# Patient Record
Sex: MALE | Race: WHITE | NOT HISPANIC OR LATINO | URBAN - METROPOLITAN AREA
[De-identification: names, ages, dates, MRNs, and addresses within clinical notes are randomized per-mention and may not be internally consistent; named-entity substitution may affect disease eponyms.]

---

## 2018-12-08 ENCOUNTER — INPATIENT (INPATIENT)
Facility: HOSPITAL | Age: 83
LOS: 2 days | Discharge: HOME HEALTH | End: 2018-12-11
Attending: INTERNAL MEDICINE | Admitting: INTERNAL MEDICINE
Payer: MEDICARE

## 2018-12-08 VITALS — TEMPERATURE: 98 F | RESPIRATION RATE: 19 BRPM | OXYGEN SATURATION: 100 % | HEART RATE: 92 BPM

## 2018-12-08 LAB
ALBUMIN SERPL ELPH-MCNC: 3.9 G/DL — SIGNIFICANT CHANGE UP (ref 3.3–5)
ALP SERPL-CCNC: 132 U/L — HIGH (ref 40–120)
ALT FLD-CCNC: 46 U/L — SIGNIFICANT CHANGE UP (ref 12–78)
ANION GAP SERPL CALC-SCNC: 10 MMOL/L — SIGNIFICANT CHANGE UP (ref 5–17)
APPEARANCE UR: CLEAR — SIGNIFICANT CHANGE UP
APTT BLD: 29.1 SEC — SIGNIFICANT CHANGE UP (ref 27.5–36.3)
AST SERPL-CCNC: 27 U/L — SIGNIFICANT CHANGE UP (ref 15–37)
BASOPHILS # BLD AUTO: 0.05 K/UL — SIGNIFICANT CHANGE UP (ref 0–0.2)
BASOPHILS NFR BLD AUTO: 0.7 % — SIGNIFICANT CHANGE UP (ref 0–2)
BILIRUB SERPL-MCNC: 0.3 MG/DL — SIGNIFICANT CHANGE UP (ref 0.2–1.2)
BILIRUB UR-MCNC: NEGATIVE — SIGNIFICANT CHANGE UP
BUN SERPL-MCNC: 13 MG/DL — SIGNIFICANT CHANGE UP (ref 7–23)
CALCIUM SERPL-MCNC: 8.9 MG/DL — SIGNIFICANT CHANGE UP (ref 8.5–10.1)
CHLORIDE SERPL-SCNC: 109 MMOL/L — HIGH (ref 96–108)
CO2 SERPL-SCNC: 24 MMOL/L — SIGNIFICANT CHANGE UP (ref 22–31)
COLOR SPEC: YELLOW — SIGNIFICANT CHANGE UP
CREAT SERPL-MCNC: 0.84 MG/DL — SIGNIFICANT CHANGE UP (ref 0.5–1.3)
DIFF PNL FLD: NEGATIVE — SIGNIFICANT CHANGE UP
EOSINOPHIL # BLD AUTO: 0.22 K/UL — SIGNIFICANT CHANGE UP (ref 0–0.5)
EOSINOPHIL NFR BLD AUTO: 3.3 % — SIGNIFICANT CHANGE UP (ref 0–6)
GLUCOSE SERPL-MCNC: 103 MG/DL — HIGH (ref 70–99)
GLUCOSE UR QL: NEGATIVE MG/DL — SIGNIFICANT CHANGE UP
HCT VFR BLD CALC: 43.8 % — SIGNIFICANT CHANGE UP (ref 39–50)
HGB BLD-MCNC: 15.2 G/DL — SIGNIFICANT CHANGE UP (ref 13–17)
IMM GRANULOCYTES NFR BLD AUTO: 0.4 % — SIGNIFICANT CHANGE UP (ref 0–1.5)
INR BLD: 1.06 RATIO — SIGNIFICANT CHANGE UP (ref 0.88–1.16)
KETONES UR-MCNC: ABNORMAL
LEUKOCYTE ESTERASE UR-ACNC: NEGATIVE — SIGNIFICANT CHANGE UP
LYMPHOCYTES # BLD AUTO: 2.43 K/UL — SIGNIFICANT CHANGE UP (ref 1–3.3)
LYMPHOCYTES # BLD AUTO: 36.1 % — SIGNIFICANT CHANGE UP (ref 13–44)
MCHC RBC-ENTMCNC: 33.8 PG — SIGNIFICANT CHANGE UP (ref 27–34)
MCHC RBC-ENTMCNC: 34.7 GM/DL — SIGNIFICANT CHANGE UP (ref 32–36)
MCV RBC AUTO: 97.3 FL — SIGNIFICANT CHANGE UP (ref 80–100)
MONOCYTES # BLD AUTO: 0.67 K/UL — SIGNIFICANT CHANGE UP (ref 0–0.9)
MONOCYTES NFR BLD AUTO: 10 % — SIGNIFICANT CHANGE UP (ref 2–14)
NEUTROPHILS # BLD AUTO: 3.33 K/UL — SIGNIFICANT CHANGE UP (ref 1.8–7.4)
NEUTROPHILS NFR BLD AUTO: 49.5 % — SIGNIFICANT CHANGE UP (ref 43–77)
NITRITE UR-MCNC: NEGATIVE — SIGNIFICANT CHANGE UP
PH UR: 5 — SIGNIFICANT CHANGE UP (ref 5–8)
PLATELET # BLD AUTO: 169 K/UL — SIGNIFICANT CHANGE UP (ref 150–400)
POTASSIUM SERPL-MCNC: 3.7 MMOL/L — SIGNIFICANT CHANGE UP (ref 3.5–5.3)
POTASSIUM SERPL-SCNC: 3.7 MMOL/L — SIGNIFICANT CHANGE UP (ref 3.5–5.3)
PROT SERPL-MCNC: 7.3 GM/DL — SIGNIFICANT CHANGE UP (ref 6–8.3)
PROT UR-MCNC: 30 MG/DL
PROTHROM AB SERPL-ACNC: 11.8 SEC — SIGNIFICANT CHANGE UP (ref 10–12.9)
RBC # BLD: 4.5 M/UL — SIGNIFICANT CHANGE UP (ref 4.2–5.8)
RBC # FLD: 12.6 % — SIGNIFICANT CHANGE UP (ref 10.3–14.5)
SODIUM SERPL-SCNC: 143 MMOL/L — SIGNIFICANT CHANGE UP (ref 135–145)
SP GR SPEC: 1.02 — SIGNIFICANT CHANGE UP (ref 1.01–1.02)
TROPONIN I SERPL-MCNC: 0.01 NG/ML — SIGNIFICANT CHANGE UP (ref 0.01–0.04)
UROBILINOGEN FLD QL: NEGATIVE MG/DL — SIGNIFICANT CHANGE UP
WBC # BLD: 6.73 K/UL — SIGNIFICANT CHANGE UP (ref 3.8–10.5)
WBC # FLD AUTO: 6.73 K/UL — SIGNIFICANT CHANGE UP (ref 3.8–10.5)

## 2018-12-08 PROCEDURE — 93010 ELECTROCARDIOGRAM REPORT: CPT

## 2018-12-08 PROCEDURE — 70450 CT HEAD/BRAIN W/O DYE: CPT | Mod: 26

## 2018-12-08 PROCEDURE — 71045 X-RAY EXAM CHEST 1 VIEW: CPT | Mod: 26

## 2018-12-08 PROCEDURE — 99291 CRITICAL CARE FIRST HOUR: CPT

## 2018-12-08 RX ORDER — ASPIRIN/CALCIUM CARB/MAGNESIUM 324 MG
325 TABLET ORAL DAILY
Qty: 0 | Refills: 0 | Status: DISCONTINUED | OUTPATIENT
Start: 2018-12-08 | End: 2018-12-10

## 2018-12-08 RX ADMIN — Medication 325 MILLIGRAM(S): at 23:38

## 2018-12-08 NOTE — ED PROVIDER NOTE - OBJECTIVE STATEMENT
82 y/o male with a PMHx of HTN, TIA presents to the ED for an episode where the pt was heard moaning, his eyes rolled back, pt had confusion that occurred 25-30 minutes PTA. Takes 81 mg ASA. Hx limited due to pt's current confusion, all hx taken by EMS.

## 2018-12-08 NOTE — ED PROVIDER NOTE - CRITICAL CARE PROVIDED
interpretation of diagnostic studies/additional history taking/conducted a detailed discussion of DNR status/direct patient care (not related to procedure)

## 2018-12-08 NOTE — CONSULT NOTE ADULT - ATTENDING COMMENTS
Agree with above. TPA not given due to rapid improvement of symptoms with minimal deficits upon arrival and the possibility of a seizure.

## 2018-12-08 NOTE — ED PROVIDER NOTE - NEUROLOGICAL, MLM
awake, slow to respond moving all 4 extremities, 5/5 strength on all 4 extremities,  CN ii-xii intact +dysmetric with right and left arm

## 2018-12-08 NOTE — ED ADULT TRIAGE NOTE - CHIEF COMPLAINT QUOTE
Pt presents to the ED c/o AMS. Pt was last seen well approximately 30 mins ago. Pt usually verbal with confusion, now with worsening confusion.  pta. Pt lives with family. Code stroke called at [id number].

## 2018-12-08 NOTE — CONSULT NOTE ADULT - SUBJECTIVE AND OBJECTIVE BOX
nEUROSCIENCE:    84 y/o male with a PMHx of HTN, TIA presents to the ED for an episode where the pt was heard moaning, his eyes rolled back, pt had confusion that occurred 25-30 minutes PTA. Takes 81 mg ASA. Hx limited due to pt's current confusion, all hx taken by EMS.  Last known well 9:15 Daughter at home with father, time of evaluation 10:15.  Pt presents with Left facial droop lower face, otherwise intact - NIHSS of 1.  Pt with GCS of 15 (E4V5M6).  Pt had CTH negative for LVO or acute CVA evidence.  Pt returns to trauma room for further managment and treatment of acute CVA type symptoms.  Neurology contacted and d/w Dr. 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  No present tob. no drug or alcohol use    Allergies  amoxicillin (Unknown)    Outpt Meds:  enalapril 2.5  AM  enalapril 5mg PM  KDur 10 meq daily  Mucinex  (stopped)  Acetyl L-Carnitine HCL 500mg BID   ASA 81mg  Betimol 0.5% both eyes  Azopt Abida 1% opth both eyes  Vitamin Supp's    ROS: Pertinent positives in HPI, all other ROS were reviewed and are negative.      Vital Signs Last 24 Hrs  T(C): 36.6 (08 Dec 2018 21:54), Max: 36.6 (08 Dec 2018 21:54)  T(F): 97.9 (08 Dec 2018 21:54), Max: 97.9 (08 Dec 2018 21:54)  HR: 92 (08 Dec 2018 21:54) (92 - 92)  BP: --  BP(mean): --  RR: 19 (08 Dec 2018 21:54) (19 - 19)  SpO2: 100% (08 Dec 2018 21:54) (100% - 100%)    Labs:                        15.2   6.73  )-----------( 169      ( 08 Dec 2018 22:16 )             43.8     PT/INR - ( 08 Dec 2018 22:16 )   PT: 11.8 sec;   INR: 1.06 ratio    PTT - ( 08 Dec 2018 22:16 )  PTT:29.1 sec    Radiology report:  - CT head: No acute hemorrhage or mass effect. Chronic changes noted      Physical Exam:  Constitutional: Awake / alert  HEENT: PERRLA, EOMI  Neck: Supple  Respiratory: Breath sounds are clear bilaterally  Cardiovascular: S1 and S2, regular rhythm  Gastrointestinal: Soft, NT/ND  Extremities:  no edema  Vascular: No carotid Bruit  Musculoskeletal: no joint swelling/tenderness, no abnormal movements  Skin: No rashes    Neurological Exam:  HF: A x O x 2 (not to year), appropriately interactive, normal affect, speech fluent, no aphasia or paraphasic errors. Naming /repetition intact   CN: PERRL, EOMI, VFF, facial sensation normal, no NLFD, tongue midline. + Left facial droop   Motor: No pronator drift, Strength 5/5 in all 4 ext, normal bulk and tone, no tremor, rigidity or bradykinesia  Sens: Intact to light touch  Reflexes: Symmetric and normal, downgoing toes b/l  Coord:  No FNFA, dysmetria, JH intact   Gait/Balance: Cannot test    A/P:  84 y/o male with a PMHx of HTN, TIA presents to the ED for an episode where the pt was heard moaning, his eyes rolled back, pt had confusion that occurred 25-30 minutes PTA. Takes 81 mg ASA. Hx limited due to pt's current confusion, all hx taken by EMS.  Last known well 9:15 Daughter at home with father, time of evaluation 10:15.  Pt presents with Left facial droop lower face, otherwise intact - NIHSS of 1.  Pt with GCS of 15 (E4V5M6).  Pt had CTH negative for LVO or acute CVA evidence.  Pt returns to trauma room for further managment and treatment of acute CVA type symptoms.  Neurology contacted and d/w Dr. Torres accordingly    - No IV tpa given because low NIH SS  - ASA 81 to 325 mg daily - give now  - Statin 80mg daily - full dose - check LFTs in am  - Dysphagia screen - passed bedside swallow eval  - DVT prophylaxis - SQ heparin BID  - MRI/A brain-carotids in AM 12/9  - PT eval  - Official Speech/swallow eval  - d/w Dr. Tomas in length and detail    Total Critical Care Time spent:  > 36 minutes in evaluating patient, medical history / record, imaging studies and implementing neuro protective measures to avoid further neurological insult or injury with suspected acute CVA.

## 2018-12-08 NOTE — ED PROVIDER NOTE - MEDICAL DECISION MAKING DETAILS
84 y/o male with episode of eyes rolled back, confusion that was witnessed by family so they called 911 and pt was brought to the ER, pt's NIH stroke scale is 3, stroke code called. Will obtain CT's blood work and reassess.

## 2018-12-09 PROCEDURE — 70544 MR ANGIOGRAPHY HEAD W/O DYE: CPT | Mod: 26,59

## 2018-12-09 PROCEDURE — 70551 MRI BRAIN STEM W/O DYE: CPT | Mod: 26

## 2018-12-09 PROCEDURE — 99223 1ST HOSP IP/OBS HIGH 75: CPT

## 2018-12-09 RX ORDER — POTASSIUM CHLORIDE 20 MEQ
10 PACKET (EA) ORAL DAILY
Qty: 0 | Refills: 0 | Status: DISCONTINUED | OUTPATIENT
Start: 2018-12-09 | End: 2018-12-11

## 2018-12-09 RX ORDER — DORZOLAMIDE HYDROCHLORIDE TIMOLOL MALEATE 20; 5 MG/ML; MG/ML
1 SOLUTION/ DROPS OPHTHALMIC EVERY 12 HOURS
Qty: 0 | Refills: 0 | Status: DISCONTINUED | OUTPATIENT
Start: 2018-12-09 | End: 2018-12-09

## 2018-12-09 RX ORDER — INFLUENZA VIRUS VACCINE 15; 15; 15; 15 UG/.5ML; UG/.5ML; UG/.5ML; UG/.5ML
0.5 SUSPENSION INTRAMUSCULAR ONCE
Qty: 0 | Refills: 0 | Status: COMPLETED | OUTPATIENT
Start: 2018-12-09 | End: 2018-12-09

## 2018-12-09 RX ORDER — ATORVASTATIN CALCIUM 80 MG/1
80 TABLET, FILM COATED ORAL AT BEDTIME
Qty: 0 | Refills: 0 | Status: DISCONTINUED | OUTPATIENT
Start: 2018-12-09 | End: 2018-12-11

## 2018-12-09 RX ORDER — BRINZOLAMIDE 10 MG/ML
1 SUSPENSION/ DROPS OPHTHALMIC
Qty: 0 | Refills: 0 | Status: DISCONTINUED | OUTPATIENT
Start: 2018-12-09 | End: 2018-12-11

## 2018-12-09 RX ADMIN — ATORVASTATIN CALCIUM 80 MILLIGRAM(S): 80 TABLET, FILM COATED ORAL at 21:38

## 2018-12-09 RX ADMIN — Medication 325 MILLIGRAM(S): at 17:07

## 2018-12-09 RX ADMIN — Medication 10 MILLIEQUIVALENT(S): at 17:07

## 2018-12-09 NOTE — SWALLOW BEDSIDE ASSESSMENT ADULT - SWALLOW EVAL: CRITERIA FOR SKILLED INTERVENTION MET
no problems identified which require skilled intervention/ACUTE SPEECH PATHOLOGY INTERVENTION IS NOT CLINICALLY WARRANTED AND WOULD NOT , NO NEED FOR SPEECH OR SWALLOWING THERAPY. GIVEN ABOVE, WILL NOT ACTIVELY FOLLOW. RECONSULT PRN.

## 2018-12-09 NOTE — SWALLOW BEDSIDE ASSESSMENT ADULT - SLP GENERAL OBSERVATIONS
The pt was alert and interactive. He was able to verbalize during communicative probes and in conversation without evidence of an overt primary motor speech or primary linguistic pathology. He was able to effectively verbalize his intents and is at reported communicative baseline. Note that pt denied Dysphagia.

## 2018-12-09 NOTE — SWALLOW BEDSIDE ASSESSMENT ADULT - COMMENTS
The pt was admitted to  with altered mentation, left facial droop and being witnessed to have his eyes roll backwards. This profile is superimposed upon a history of HTN and past TIA. The patient was admitted to  with altered mentation, left facial droop and being witnessed to have his eyes roll backwards. This profile is superimposed upon a history of HTN and past TIA.

## 2018-12-09 NOTE — ED ADULT NURSE NOTE - OBJECTIVE STATEMENT
83 year old male with a past medical history of hypertension and CAD on hypertension and aspirin presenting to the ED via EMS triage for AMS. Family states that 30 min prior to arrival they witnessed the patient moaning, eyes roll into the back of his head, and go temporarily unresponsive. Family called EMS, and MD Spence called a code stroke in EMS triage. Patient is AMS verbal, GCS 14. Decreased cognition as per family, patient is usually GCS 15/A+Ox3. Mild dysarthria with a NIH of 3 as per MD Spence.   Negative: fevers, chills, headache, vision changes, hearing changes, focal/lateralizing neurologic deficits, throat pain, chest pain, palpitations, shortness of breath, wheezing, abdominal pain, nausea, vomiting, constipation, diarrhea, urinary signs/symptoms, or edema.   PERRLA. S1S2 heard. Lung sounds clear. Abdomen soft/non-tender. FROM/CMS throughout all extremities.

## 2018-12-09 NOTE — CONSULT NOTE ADULT - SUBJECTIVE AND OBJECTIVE BOX
Patient is a 83y old  Male who presents with a chief complaint of NIHSS OF 1 : LEVE FACIAL DROOP (LOWER FACE WEAKNESS) (08 Dec 2018 22:42)      HPI:   82 y/o male with a PMHx of HTN, TIA presents to the ED for an episode where the pt was heard moaning, his eyes rolled back, pt had confusion that occurred 25-30 minutes prior to arrival to the hospital. He does not recall the episode. Upon arrival to the ED he did have some subtle left lower facial droop which is now resolved. A decision as made not to give TPA due to rapid improvement of symptoms with minimal deficits. He has no complaints at this time.       PAST MEDICAL & SURGICAL HISTORY:  HTN (hypertension)      FAMILY HISTORY: non contributory      Social Hx:  Nonsmoker, no drug or alcohol use    MEDICATIONS  (STANDING):  aspirin enteric coated 325 milliGRAM(s) Oral daily  atorvastatin 80 milliGRAM(s) Oral at bedtime  enalapril 5 milliGRAM(s) Oral at bedtime  enalapril 2.5 milliGRAM(s) Oral daily  potassium chloride    Tablet ER 10 milliEquivalent(s) Oral daily       Allergies    amoxicillin (Unknown)    Intolerances        ROS: Pertinent positives in HPI, all other ROS were reviewed and are negative.      Vital Signs Last 24 Hrs  T(C): 36.6 (09 Dec 2018 12:07), Max: 36.6 (08 Dec 2018 21:54)  T(F): 97.8 (09 Dec 2018 12:07), Max: 97.9 (08 Dec 2018 21:54)  HR: 52 (09 Dec 2018 12:07) (52 - 92)  BP: 164/42 (09 Dec 2018 12:07) (122/80 - 175/79)  BP(mean): --  RR: 16 (09 Dec 2018 12:07) (16 - 19)  SpO2: 100% (09 Dec 2018 12:07) (100% - 100%)        Constitutional: awake and alert.  HEENT: PERRLA, EOMI,   Neck: Supple.  Respiratory: Breath sounds are clear bilaterally  Cardiovascular: S1 and S2, regular / irregular rhythm  Gastrointestinal: soft, nontender  Extremities:  no edema  Vascular: Carotid Bruit - no  Musculoskeletal: no joint swelling/tenderness, no abnormal movements  Skin: No rashes  tongue - bruising on left anterolateral tongue    Neurological exam:  HF: A x O x 3. Appropriately interactive, normal affect. Speech fluent, No Aphasia or paraphasic errors. Naming /repetition intact   CN: AMINA, EOMI, VFF, facial sensation normal, no NLFD, tongue midline, Palate moves equally, SCM equal bilaterally  Motor: No pronator drift, Strength 5/5 in all 4 ext, normal bulk and tone, no tremor, rigidity or bradykinesia.    Sens: Intact to light touch / PP/ VS/ JS    Reflexes: Symmetric and normal . BJ 1+, BR 1+, KJ 1+, , downgoing toes b/l  Coord:  No FNFA, dysmetria, JH intact   Gait/Balance: Normal/Cannot test          Labs:   12-08    143  |  109<H>  |  13  ----------------------------<  103<H>  3.7   |  24  |  0.84    Ca    8.9      08 Dec 2018 22:16    TPro  7.3  /  Alb  3.9  /  TBili  0.3  /  DBili  x   /  AST  27  /  ALT  46  /  AlkPhos  132<H>  12-08                              15.2   6.73  )-----------( 169      ( 08 Dec 2018 22:16 )             43.8       Radiology:  CT head 12/8/18:      No acute hemorrhage or mass effect. Chronic changes noted

## 2018-12-09 NOTE — ED ADULT NURSE NOTE - NSIMPLEMENTINTERV_GEN_ALL_ED
Implemented All Fall with Harm Risk Interventions:  [place] to call system. Call bell, personal items and telephone within reach. Instruct patient to call for assistance. Room bathroom lighting operational. Non-slip footwear when patient is off stretcher. Physically safe environment: no spills, clutter or unnecessary equipment. Stretcher in lowest position, wheels locked, appropriate side rails in place. Provide visual cue, wrist band, yellow gown, etc. Monitor gait and stability. Monitor for mental status changes and reorient to person, place, and time. Review medications for side effects contributing to fall risk. Reinforce activity limits and safety measures with patient and family. Provide visual clues: red socks.

## 2018-12-09 NOTE — SWALLOW BEDSIDE ASSESSMENT ADULT - SWALLOW EVAL: DIAGNOSIS
1) The patient demonstrates Oropharyngeal Swallowing abilities which subjectively appear to be within functional parameters for age. NO behavioral aspiration signs exhibited on exam. Odynophagia was denied.  2) The pt was alert and interactive. He was able to verbalize during communicative probes and in conversation without evidence of an overt primary motor speech or primary linguistic pathology. He was able to effectively verbalize his intents and is at reported communicative baseline.

## 2018-12-09 NOTE — SWALLOW BEDSIDE ASSESSMENT ADULT - SWALLOW EVAL: RECOMMENDED DIET
SUGGEST A DASH/TLC REGULAR TEXTURE DIET WITH THIN LIQUID CONSISTENCIES AS HE APPEARED CLINICALLY TOLERANT OF THESE FOOD CONSISTENCIES FROM AN OROPHARYNGEAL SWALLOWING STANCE ON CLINICAL EXAM.

## 2018-12-09 NOTE — ED ADULT NURSE REASSESSMENT NOTE - NS ED NURSE REASSESS COMMENT FT1
Code stroke discontinued after patient was evaluated by Neuro JARROD Godwin and ED MD. NIH is currently 1 as per ED MD. Patient passed bedside speech/swallow. Patient and family education on fall risk status performed. VSS. Will continue to monitor/reassess.
ED MD Hinton and RN Melchor spoke with patient and family to provide them with results from all diagnostics, as well as update them on the patient's status, the current plan of care, the admission process, and education on CVAs. Patient has no current change in status at this time. VSS. Will continue to monitor/reassess.
Nurse Rounding: Patient is sleeping at time of rounding, no apparent distress, complaints, or comfort measures needed at this time. Will continue to monitor/reassess and ensure comfort/safety. Patient is awaiting hospitalist MD admission assessment and bed placement. No change in patient's status or condition. Will continue to monitor/reassess. VSS.
Nurse Rounding: Patient is sleeping at time of rounding, no apparent distress, complaints, or comfort measures needed at this time. Will continue to monitor/reassess and ensure comfort/safety. Patient is awaiting hospitalist MD admission assessment and bed placement. No change in patient's status or condition. Will continue to monitor/reassess. VSS.

## 2018-12-09 NOTE — ED ADULT NURSE NOTE - CHIEF COMPLAINT QUOTE
Pt presents to the ED c/o AMS. Pt was last seen well approximately 30 mins ago. Pt usually verbal with confusion, now with worsening confusion.  pta. Pt lives with family. Code stroke called at 1075.

## 2018-12-10 LAB
ANION GAP SERPL CALC-SCNC: 7 MMOL/L — SIGNIFICANT CHANGE UP (ref 5–17)
BUN SERPL-MCNC: 16 MG/DL — SIGNIFICANT CHANGE UP (ref 7–23)
CALCIUM SERPL-MCNC: 8 MG/DL — LOW (ref 8.5–10.1)
CHLORIDE SERPL-SCNC: 111 MMOL/L — HIGH (ref 96–108)
CHOLEST SERPL-MCNC: 144 MG/DL — SIGNIFICANT CHANGE UP (ref 10–199)
CO2 SERPL-SCNC: 27 MMOL/L — SIGNIFICANT CHANGE UP (ref 22–31)
CREAT SERPL-MCNC: 0.85 MG/DL — SIGNIFICANT CHANGE UP (ref 0.5–1.3)
CULTURE RESULTS: NO GROWTH — SIGNIFICANT CHANGE UP
GLUCOSE SERPL-MCNC: 91 MG/DL — SIGNIFICANT CHANGE UP (ref 70–99)
HBA1C BLD-MCNC: 5.5 % — SIGNIFICANT CHANGE UP (ref 4–5.6)
HCT VFR BLD CALC: 40.1 % — SIGNIFICANT CHANGE UP (ref 39–50)
HDLC SERPL-MCNC: 36 MG/DL — LOW
HGB BLD-MCNC: 13.6 G/DL — SIGNIFICANT CHANGE UP (ref 13–17)
LIPID PNL WITH DIRECT LDL SERPL: 90 MG/DL — SIGNIFICANT CHANGE UP
MCHC RBC-ENTMCNC: 32.9 PG — SIGNIFICANT CHANGE UP (ref 27–34)
MCHC RBC-ENTMCNC: 33.9 GM/DL — SIGNIFICANT CHANGE UP (ref 32–36)
MCV RBC AUTO: 96.9 FL — SIGNIFICANT CHANGE UP (ref 80–100)
NRBC # BLD: 0 /100 WBCS — SIGNIFICANT CHANGE UP (ref 0–0)
PLATELET # BLD AUTO: 164 K/UL — SIGNIFICANT CHANGE UP (ref 150–400)
POTASSIUM SERPL-MCNC: 3.9 MMOL/L — SIGNIFICANT CHANGE UP (ref 3.5–5.3)
POTASSIUM SERPL-SCNC: 3.9 MMOL/L — SIGNIFICANT CHANGE UP (ref 3.5–5.3)
RBC # BLD: 4.14 M/UL — LOW (ref 4.2–5.8)
RBC # FLD: 12.8 % — SIGNIFICANT CHANGE UP (ref 10.3–14.5)
SODIUM SERPL-SCNC: 145 MMOL/L — SIGNIFICANT CHANGE UP (ref 135–145)
SPECIMEN SOURCE: SIGNIFICANT CHANGE UP
TOTAL CHOLESTEROL/HDL RATIO MEASUREMENT: 4 RATIO — SIGNIFICANT CHANGE UP (ref 3.4–9.6)
TRIGL SERPL-MCNC: 90 MG/DL — SIGNIFICANT CHANGE UP (ref 10–149)
WBC # BLD: 7.61 K/UL — SIGNIFICANT CHANGE UP (ref 3.8–10.5)
WBC # FLD AUTO: 7.61 K/UL — SIGNIFICANT CHANGE UP (ref 3.8–10.5)

## 2018-12-10 PROCEDURE — 70552 MRI BRAIN STEM W/DYE: CPT | Mod: 26

## 2018-12-10 PROCEDURE — 99233 SBSQ HOSP IP/OBS HIGH 50: CPT

## 2018-12-10 PROCEDURE — 93306 TTE W/DOPPLER COMPLETE: CPT | Mod: 26

## 2018-12-10 PROCEDURE — 95819 EEG AWAKE AND ASLEEP: CPT | Mod: 26

## 2018-12-10 RX ORDER — LEVETIRACETAM 250 MG/1
500 TABLET, FILM COATED ORAL
Qty: 0 | Refills: 0 | Status: DISCONTINUED | OUTPATIENT
Start: 2018-12-10 | End: 2018-12-10

## 2018-12-10 RX ORDER — CLOPIDOGREL BISULFATE 75 MG/1
75 TABLET, FILM COATED ORAL DAILY
Qty: 0 | Refills: 0 | Status: DISCONTINUED | OUTPATIENT
Start: 2018-12-10 | End: 2018-12-11

## 2018-12-10 RX ORDER — ASPIRIN/CALCIUM CARB/MAGNESIUM 324 MG
81 TABLET ORAL DAILY
Qty: 0 | Refills: 0 | Status: DISCONTINUED | OUTPATIENT
Start: 2018-12-10 | End: 2018-12-11

## 2018-12-10 RX ORDER — LEVETIRACETAM 250 MG/1
250 TABLET, FILM COATED ORAL
Qty: 0 | Refills: 0 | Status: DISCONTINUED | OUTPATIENT
Start: 2018-12-10 | End: 2018-12-11

## 2018-12-10 RX ORDER — ENOXAPARIN SODIUM 100 MG/ML
40 INJECTION SUBCUTANEOUS DAILY
Qty: 0 | Refills: 0 | Status: DISCONTINUED | OUTPATIENT
Start: 2018-12-10 | End: 2018-12-11

## 2018-12-10 RX ADMIN — LEVETIRACETAM 500 MILLIGRAM(S): 250 TABLET, FILM COATED ORAL at 12:55

## 2018-12-10 RX ADMIN — ATORVASTATIN CALCIUM 80 MILLIGRAM(S): 80 TABLET, FILM COATED ORAL at 22:40

## 2018-12-10 RX ADMIN — Medication 10 MILLIEQUIVALENT(S): at 12:55

## 2018-12-10 RX ADMIN — Medication 325 MILLIGRAM(S): at 12:55

## 2018-12-10 RX ADMIN — ENOXAPARIN SODIUM 40 MILLIGRAM(S): 100 INJECTION SUBCUTANEOUS at 22:40

## 2018-12-10 RX ADMIN — BRINZOLAMIDE 1 DROP(S): 10 SUSPENSION/ DROPS OPHTHALMIC at 18:30

## 2018-12-10 RX ADMIN — Medication 7.5 MILLIGRAM(S): at 05:49

## 2018-12-10 RX ADMIN — BRINZOLAMIDE 1 DROP(S): 10 SUSPENSION/ DROPS OPHTHALMIC at 05:49

## 2018-12-10 NOTE — PHYSICAL THERAPY INITIAL EVALUATION ADULT - MODALITIES TREATMENT COMMENTS
pt left supine on stretcher in hallway post Eval; HM in place; daughter present; jess Durham taking pt for Echo; pt instructed not to get up alone; call nursing for assist; nehemias well; denied pain

## 2018-12-10 NOTE — PROGRESS NOTE ADULT - SUBJECTIVE AND OBJECTIVE BOX
12/10: no complaints; no ac cva on MRI with iv contrast  keppra started by neuro      PHYSICAL EXAM:    Daily     Daily     ICU Vital Signs Last 24 Hrs  T(C): 37 (10 Dec 2018 10:12), Max: 37 (10 Dec 2018 10:12)  T(F): 98.6 (10 Dec 2018 10:12), Max: 98.6 (10 Dec 2018 10:12)  HR: 60 (10 Dec 2018 10:12) (54 - 60)  BP: 151/74 (10 Dec 2018 10:12) (149/60 - 152/23)  BP(mean): --  ABP: --  ABP(mean): --  RR: 18 (10 Dec 2018 10:12) (16 - 18)  SpO2: 100% (10 Dec 2018 10:12) (98% - 100%)      Constitutional: Well appearing  HEENT: Atraumatic, BOO, Normal, No congestion  Respiratory: Breath Sounds normal, no rhonchi/wheeze  Cardiovascular: N S1S2;   Gastrointestinal: Abdomen soft, non tender, Bowel Sounds present  Extremities: No edema, peripheral pulses present  Neurological: AAO x 3, no gross focal motor deficits  Skin: Non cellulitic, no rash, ulcers  Lymph Nodes: No lymphadenopathy noted  Back: No CVA tenderness   Musculoskeletal: non tender  Breasts: Deferred  Genitourinary: deferred  Rectal: Deferred                          13.6   7.61  )-----------( 164      ( 10 Dec 2018 04:58 )             40.1       CBC Full  -  ( 10 Dec 2018 04:58 )  WBC Count : 7.61 K/uL  Hemoglobin : 13.6 g/dL  Hematocrit : 40.1 %  Platelet Count - Automated : 164 K/uL  Mean Cell Volume : 96.9 fl  Mean Cell Hemoglobin : 32.9 pg  Mean Cell Hemoglobin Concentration : 33.9 gm/dL  Auto Neutrophil # : x  Auto Lymphocyte # : x  Auto Monocyte # : x  Auto Eosinophil # : x  Auto Basophil # : x  Auto Neutrophil % : x  Auto Lymphocyte % : x  Auto Monocyte % : x  Auto Eosinophil % : x  Auto Basophil % : x      12-10    145  |  111<H>  |  16  ----------------------------<  91  3.9   |  27  |  0.85    Ca    8.0<L>      10 Dec 2018 04:58    TPro  7.3  /  Alb  3.9  /  TBili  0.3  /  DBili  x   /  AST  27  /  ALT  46  /  AlkPhos  132<H>  12-08      LIVER FUNCTIONS - ( 08 Dec 2018 22:16 )  Alb: 3.9 g/dL / Pro: 7.3 gm/dL / ALK PHOS: 132 U/L / ALT: 46 U/L / AST: 27 U/L / GGT: x             PT/INR - ( 08 Dec 2018 22:16 )   PT: 11.8 sec;   INR: 1.06 ratio         PTT - ( 08 Dec 2018 22:16 )  PTT:29.1 sec    CARDIAC MARKERS ( 08 Dec 2018 22:16 )  0.015 ng/mL / x     / x     / x     / x            Urinalysis Basic - ( 08 Dec 2018 22:36 )    Color: Yellow / Appearance: Clear / S.025 / pH: x  Gluc: x / Ketone: Trace  / Bili: Negative / Urobili: Negative mg/dL   Blood: x / Protein: 30 mg/dL / Nitrite: Negative   Leuk Esterase: Negative / RBC: 0-2 /HPF / WBC 0-2   Sq Epi: x / Non Sq Epi: Negative / Bacteria: Few            MEDICATIONS  (STANDING):  aspirin  chewable 81 milliGRAM(s) Oral daily  atorvastatin 80 milliGRAM(s) Oral at bedtime  Betimol 1 Drop(s) 1 Drop(s) Both EYES two times a day  brinzolamide 1% Ophthalmic Suspension 1 Drop(s) Both EYES two times a day  clopidogrel Tablet 75 milliGRAM(s) Oral daily  enalapril 7.5 milliGRAM(s) Oral <User Schedule>  enoxaparin Injectable 40 milliGRAM(s) SubCutaneous daily  levETIRAcetam 500 milliGRAM(s) Oral two times a day  potassium chloride    Tablet ER 10 milliEquivalent(s) Oral daily

## 2018-12-10 NOTE — PROGRESS NOTE ADULT - SUBJECTIVE AND OBJECTIVE BOX
Interval History:   12/10/18: The patient has no new complaints today. He denies any history of seizures.     MEDICATIONS  (STANDING):  aspirin enteric coated 325 milliGRAM(s) Oral daily  atorvastatin 80 milliGRAM(s) Oral at bedtime  Betimol 1 Drop(s) 1 Drop(s) Both EYES two times a day  brinzolamide 1% Ophthalmic Suspension 1 Drop(s) Both EYES two times a day  enalapril 7.5 milliGRAM(s) Oral <User Schedule>  potassium chloride    Tablet ER 10 milliEquivalent(s) Oral daily    MEDICATIONS  (PRN):      Allergies    amoxicillin (Unknown)    Intolerances        PHYSICAL EXAM:  Vital Signs Last 24 Hrs  T(F): 98.4 (12-10-18 @ 05:16)  HR: 60 (12-10-18 @ 05:16)  BP: 149/60 (12-10-18 @ 05:16)  RR: 18 (12-10-18 @ 05:16)    GENERAL: NAD, well-groomed, well-developed  HEAD:  Atraumatic, Normocephalic  Neuro:  Awake, alert, no aphasia  CN: PERRL, EOMI, no nystagmus, no facial weakness, tongue protrudes in the midline  motor: normal tone, no pronator drift, full strength in all four extremities  sensory: intact to light touch  coordination: finger to nose intact bilaterally  DTRs: symmetric    LABS:                        13.6   7.61  )-----------( 164      ( 10 Dec 2018 04:58 )             40.1     12-10    145  |  111<H>  |  16  ----------------------------<  91  3.9   |  27  |  0.85    Ca    8.0<L>      10 Dec 2018 04:58    TPro  7.3  /  Alb  3.9  /  TBili  0.3  /  DBili  x   /  AST  27  /  ALT  46  /  AlkPhos  132<H>  12-08    PT/INR - ( 08 Dec 2018 22:16 )   PT: 11.8 sec;   INR: 1.06 ratio         PTT - ( 08 Dec 2018 22:16 )  PTT:29.1 sec  Urinalysis Basic - ( 08 Dec 2018 22:36 )    Color: Yellow / Appearance: Clear / S.025 / pH: x  Gluc: x / Ketone: Trace  / Bili: Negative / Urobili: Negative mg/dL   Blood: x / Protein: 30 mg/dL / Nitrite: Negative   Leuk Esterase: Negative / RBC: 0-2 /HPF / WBC 0-2   Sq Epi: x / Non Sq Epi: Negative / Bacteria: Few        RADIOLOGY & ADDITIONAL STUDIES:  CT head 18:      No acute hemorrhage or mass effect. Chronic changes noted    MRI brain 18: IMPRESSION: No acute infarct.  Minimal periventricular T2/FLAIR   hyperintensities, suggestive of minimal chronic microvascular ischemic   changes. . There is a chronic lacunar infarct in the right posterior   basal ganglia.          MRA brain 18    There is atherosclerotic moderate narrowing of the distal petrous segment   of the left internal carotid artery.     There is a focal high-grade stenosis of the proximal P1 segment of the   right posterior cerebral artery.     There are 2 mild stenoses of the distal basilar artery.     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    EEG 12/10/18:   EEG Summary/Classification:  This was an abnormal EEG study in the awake and drowsy states due to the   presence of left fronto-temporal sharp waves.     EEG Impression/Clinical Correlate:  The findings are consistent with focal cortical hyperexcitability   indicative of a risk for partial onset seizures. Clinical correlation and   correlation with neuroimaging is recommended.

## 2018-12-10 NOTE — PHYSICAL THERAPY INITIAL EVALUATION ADULT - LIVES WITH, PROFILE
spouse/spouse / house in [facility].; pt staying locally with daughter; no stairs (stair lift)/children

## 2018-12-11 ENCOUNTER — TRANSCRIPTION ENCOUNTER (OUTPATIENT)
Age: 83
End: 2018-12-11

## 2018-12-11 VITALS — SYSTOLIC BLOOD PRESSURE: 132 MMHG | DIASTOLIC BLOOD PRESSURE: 68 MMHG | HEART RATE: 62 BPM

## 2018-12-11 DIAGNOSIS — I25.10 ATHEROSCLEROTIC HEART DISEASE OF NATIVE CORONARY ARTERY WITHOUT ANGINA PECTORIS: ICD-10-CM

## 2018-12-11 DIAGNOSIS — R56.9 UNSPECIFIED CONVULSIONS: ICD-10-CM

## 2018-12-11 DIAGNOSIS — G45.9 TRANSIENT CEREBRAL ISCHEMIC ATTACK, UNSPECIFIED: ICD-10-CM

## 2018-12-11 DIAGNOSIS — I35.9 NONRHEUMATIC AORTIC VALVE DISORDER, UNSPECIFIED: ICD-10-CM

## 2018-12-11 PROCEDURE — 99223 1ST HOSP IP/OBS HIGH 75: CPT

## 2018-12-11 PROCEDURE — 99232 SBSQ HOSP IP/OBS MODERATE 35: CPT

## 2018-12-11 RX ORDER — LEVETIRACETAM 250 MG/1
500 TABLET, FILM COATED ORAL
Qty: 0 | Refills: 0 | Status: DISCONTINUED | OUTPATIENT
Start: 2018-12-11 | End: 2018-12-11

## 2018-12-11 RX ORDER — BRINZOLAMIDE 10 MG/ML
1 SUSPENSION/ DROPS OPHTHALMIC
Qty: 0 | Refills: 0 | COMMUNITY

## 2018-12-11 RX ORDER — CLOPIDOGREL BISULFATE 75 MG/1
1 TABLET, FILM COATED ORAL
Qty: 30 | Refills: 0 | OUTPATIENT
Start: 2018-12-11 | End: 2019-01-09

## 2018-12-11 RX ORDER — ASPIRIN/CALCIUM CARB/MAGNESIUM 324 MG
81 TABLET ORAL
Qty: 0 | Refills: 0 | COMMUNITY

## 2018-12-11 RX ORDER — LEVETIRACETAM 250 MG/1
1 TABLET, FILM COATED ORAL
Qty: 60 | Refills: 0 | OUTPATIENT
Start: 2018-12-11 | End: 2019-01-09

## 2018-12-11 RX ORDER — POTASSIUM CHLORIDE 20 MEQ
1 PACKET (EA) ORAL
Qty: 0 | Refills: 0 | COMMUNITY

## 2018-12-11 RX ORDER — ATORVASTATIN CALCIUM 80 MG/1
1 TABLET, FILM COATED ORAL
Qty: 30 | Refills: 0 | OUTPATIENT
Start: 2018-12-11 | End: 2019-01-09

## 2018-12-11 RX ADMIN — Medication 10 MILLIEQUIVALENT(S): at 12:05

## 2018-12-11 RX ADMIN — Medication 81 MILLIGRAM(S): at 12:05

## 2018-12-11 RX ADMIN — CLOPIDOGREL BISULFATE 75 MILLIGRAM(S): 75 TABLET, FILM COATED ORAL at 12:05

## 2018-12-11 RX ADMIN — LEVETIRACETAM 250 MILLIGRAM(S): 250 TABLET, FILM COATED ORAL at 05:53

## 2018-12-11 RX ADMIN — Medication 7.5 MILLIGRAM(S): at 05:54

## 2018-12-11 NOTE — DISCHARGE NOTE ADULT - NSTOBACCOHOTLINE_GEN_A_CS
[facility] Smokers Quitline ([id number]) [facility] Smokers Quitline ([id number]-[id number])

## 2018-12-11 NOTE — PHARMACOTHERAPY INTERVENTION NOTE - COMMENTS
Recommended DVT px
Recommended aspirin 81 mg and plavix 75 mg daily
Completed med rec. Home medications reviewed with patient and family.  He takes Enalapril 7.5mg in the morning and 5mg at bedtime

## 2018-12-11 NOTE — DISCHARGE NOTE ADULT - CARE PROVIDER_API CALL
Monster Ardon), Cardiovascular Disease  [address] [place] [address]  Phone: [phone]  Fax: [phone]    Naomi Tomas), Clinical Neurophysiology; EEGEpilepsy; Neurology; Sleep Medicine  [address] [place] [address]  Phone: [phone]  Fax: [phone]

## 2018-12-11 NOTE — CONSULT NOTE ADULT - PROBLEM SELECTOR RECOMMENDATION 9
Events seem most c/w this. Neurology has initiated treatment and is addressing this.  This does not sound c/w a TIA.

## 2018-12-11 NOTE — DISCHARGE NOTE ADULT - HOSPITAL COURSE
PHYSICAL EXAM:    Daily     Daily     ICU Vital Signs Last 24 Hrs  T(C): 36.8 (11 Dec 2018 10:52), Max: 36.8 (10 Dec 2018 21:45)  T(F): 98.3 (11 Dec 2018 10:52), Max: 98.3 (11 Dec 2018 10:52)  HR: 60 (11 Dec 2018 10:52) (56 - 62)  BP: 136/63 (11 Dec 2018 10:52) (136/63 - 158/43)  BP(mean): --  ABP: --  ABP(mean): --  RR: 18 (11 Dec 2018 10:52) (18 - 18)  SpO2: 100% (11 Dec 2018 10:52) (96% - 100%)      Constitutional: Well appearing  HEENT: Atraumatic, BOO, Normal, No congestion  Respiratory: Breath Sounds normal, no rhonchi/wheeze  Cardiovascular: N S1S2; TERESA present  Gastrointestinal: Abdomen soft, non tender, Bowel Sounds present  Extremities: No edema, peripheral pulses present  Neurological: AAO x 3, no gross focal motor deficits  Skin: Non cellulitic, no rash, ulcers  Lymph Nodes: No lymphadenopathy noted  Back: No CVA tenderness   Musculoskeletal: non tender  Breasts: Deferred  Genitourinary: deferred  Rectal: Deferred      83/M admitted with     TIA, No CVA, MRI with and without contrast are negative for acute cva  ? Seizure  HTN    Plan:   continue plavix 75, asa to 81; statin, enalapril  keppra added as per neuro for abn EEG  cardio consult appreciated; eval for LINQ as out pt.   echo noted.   f/u with pcp/neuro/cardio within 1 week    cleared for d/c by cardio and neuro    poc discussed with pt, family at bedside, Doyle Ardon/Genoveva.    d/c home today    total time spent 50 min

## 2018-12-11 NOTE — DISCHARGE NOTE ADULT - HOME CARE AGENCY
As per family request, a home care referral was made to a certified home health agency (HA) near his  daughter's [facility] home.   [facility] ([phone]). For skilled nurse assessment & physical therapy services.   As per the [facility], the earliest possible start of care date is: 12/17/18

## 2018-12-11 NOTE — DISCHARGE NOTE ADULT - PLAN OF CARE
resolved continue taking, asa 81, plavix, lipitor, keppra  f/u with pcp/cardio/neuro in 1 week  LINQ possibility as out pt.

## 2018-12-11 NOTE — DISCHARGE NOTE ADULT - NS AS DC STROKE ED MATERIALS
Stroke Education Booklet/Need for Followup After Discharge/Prescribed Medications/Stroke Warning Signs and Symptoms/Call 911 for Stroke/Risk Factors for Stroke

## 2018-12-11 NOTE — DISCHARGE NOTE ADULT - PATIENT PORTAL LINK FT
You can access the [facility] Patient Portal, offered by [facility], by registering with the following website: [email or web address]

## 2018-12-11 NOTE — PROGRESS NOTE ADULT - SUBJECTIVE AND OBJECTIVE BOX
Interval History:  12/10/18: The patient has no new complaints today. He denies any history of seizures.   12/11/18: No new complaints today. The dose of Keppra was reduced last night due to family's report of patient being confused. He has no confusion today.       MEDICATIONS  (STANDING):  aspirin  chewable 81 milliGRAM(s) Oral daily  atorvastatin 80 milliGRAM(s) Oral at bedtime  Betimol 1 Drop(s) 1 Drop(s) Both EYES two times a day  brinzolamide 1% Ophthalmic Suspension 1 Drop(s) Both EYES two times a day  clopidogrel Tablet 75 milliGRAM(s) Oral daily  enalapril 7.5 milliGRAM(s) Oral <User Schedule>  enoxaparin Injectable 40 milliGRAM(s) SubCutaneous daily  levETIRAcetam 500 milliGRAM(s) Oral two times a day  potassium chloride    Tablet ER 10 milliEquivalent(s) Oral daily    MEDICATIONS  (PRN):      Allergies    amoxicillin (Unknown)    Intolerances        PHYSICAL EXAM:  Vital Signs Last 24 Hrs  T(F): 98.3 (12-11-18 @ 10:52)  HR: 60 (12-11-18 @ 10:52)  BP: 136/63 (12-11-18 @ 10:52)  RR: 18 (12-11-18 @ 10:52)    GENERAL: NAD, well-groomed, well-developed  HEAD:  Atraumatic, Normocephalic  Neuro:  Awake, alert, no aphasia. Oriented to person, place, month, year. Able to name the President.   CN: PERRL, EOMI, no nystagmus, no facial weakness, tongue protrudes in the midline  motor: normal tone, no pronator drift, full strength in all four extremities  sensory: intact to light touch  coordination: finger to nose intact bilaterally      LABS:                        13.6   7.61  )-----------( 164      ( 10 Dec 2018 04:58 )             40.1     12-10    145  |  111<H>  |  16  ----------------------------<  91  3.9   |  27  |  0.85    Ca    8.0<L>      10 Dec 2018 04:58            RADIOLOGY & ADDITIONAL STUDIES:      CT head 12/8/18:      No acute hemorrhage or mass effect. Chronic changes noted    MRI brain 12/9/18: IMPRESSION: No acute infarct.  Minimal periventricular T2/FLAIR   hyperintensities, suggestive of minimal chronic microvascular ischemic   changes. . There is a chronic lacunar infarct in the right posterior   basal ganglia.          MRA brain 12/9/18    There is atherosclerotic moderate narrowing of the distal petrous segment   of the left internal carotid artery.     There is a focal high-grade stenosis of the proximal P1 segment of the   right posterior cerebral artery.     There are 2 mild stenoses of the distal basilar artery.     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    EEG 12/10/18:   EEG Summary/Classification:  This was an abnormal EEG study in the awake and drowsy states due to the   presence of left fronto-temporal sharp waves.     EEG Impression/Clinical Correlate:  The findings are consistent with focal cortical hyperexcitability   indicative of a risk for partial onset seizures. Clinical correlation and   correlation with neuroimaging is recommended.     MRI head with contrast 12/10/18:     No abnormal intracranial enhancement. No mass effect or midline shift.    Chronic left thalamic lacunar infarct.

## 2018-12-11 NOTE — DISCHARGE NOTE ADULT - CARE PLAN
Principal Discharge DX:	TIA (transient ischemic attack)  Goal:	resolved  Assessment and plan of treatment:	continue taking, asa 81, plavix, lipitor, keppra  f/u with pcp/cardio/neuro in 1 week  LINQ possibility as out pt.

## 2018-12-11 NOTE — DISCHARGE NOTE ADULT - MEDICATION SUMMARY - MEDICATIONS TO STOP TAKING
I will STOP taking the medications listed below when I get home from the hospital:    enalapril 2.5 mg oral tablet  -- 1 tab(s) by mouth once a day in the am    enalapril 5 mg oral tablet  -- 1 tab(s) by mouth once a day (at bedtime)    *Also takes 7.5mg in the morning

## 2018-12-11 NOTE — DISCHARGE NOTE ADULT - MEDICATION SUMMARY - MEDICATIONS TO TAKE
I will START or STAY ON the medications listed below when I get home from the hospital:    Nick Aspirin  -- 81 milligram(s) by mouth once a day  -- Indication: For .    enalapril 2.5 mg oral tablet  -- 3 tab(s) by mouth once a day   -- Indication: For .    levETIRAcetam 500 mg oral tablet  -- 1 tab(s) by mouth 2 times a day  -- Indication: For .    Lipitor 20 mg oral tablet  -- 1 tab(s) by mouth once a day  -- Indication: For .    clopidogrel 75 mg oral tablet  -- 1 tab(s) by mouth once a day  -- Indication: For .    Klor-Con 10 oral tablet, extended release  -- 1 tab(s) by mouth once a day  -- Indication: For .    Azopt 1% ophthalmic suspension  -- 1 drop(s) to each affected eye 2 times a day  -- Indication: For .    Betimol 0.5% ophthalmic solution  -- 1 drop(s) to each affected eye 2 times a day  -- Indication: For . I will START or STAY ON the medications listed below when I get home from the hospital:    Nick Aspirin  -- 81 milligram(s) by mouth once a day  -- Indication: For .    enalapril 2.5 mg oral tablet  -- 3 tab(s) by mouth once a day   -- Indication: For .    levETIRAcetam 500 mg oral tablet  -- 1 tab(s) by mouth 2 times a day  -- Indication: For .    Lipitor 20 mg oral tablet  -- 1 tab(s) by mouth once a day  -- Indication: For .    clopidogrel 75 mg oral tablet  -- 1 tab(s) by mouth once a day  -- Indication: For .    Klor-Con 10 oral tablet, extended release  -- 1 tab(s) by mouth once a day  -- Indication: For .    Betimol 0.5% ophthalmic solution  -- 1 drop(s) to each affected eye 2 times a day  -- Indication: For .    Azopt 1% ophthalmic suspension  -- 1 drop(s) to each affected eye 2 times a day  -- Indication: For .

## 2018-12-11 NOTE — DISCHARGE NOTE ADULT - CARE PROVIDERS DIRECT ADDRESSES
[email or web address]

## 2018-12-11 NOTE — CONSULT NOTE ADULT - PROBLEM SELECTOR RECOMMENDATION 2
Despite not sounding c/w with a TIA his substrate puts him at great risk for this and therefor should have an outpatient event monitor to r/o occult PAF.

## 2018-12-12 ENCOUNTER — APPOINTMENT (OUTPATIENT)
Dept: CARDIOLOGY | Facility: CLINIC | Age: 83
End: 2018-12-12

## 2018-12-13 DIAGNOSIS — R29.810 FACIAL WEAKNESS: ICD-10-CM

## 2018-12-13 DIAGNOSIS — I10 ESSENTIAL (PRIMARY) HYPERTENSION: ICD-10-CM

## 2018-12-13 DIAGNOSIS — G45.9 TRANSIENT CEREBRAL ISCHEMIC ATTACK, UNSPECIFIED: ICD-10-CM

## 2018-12-13 DIAGNOSIS — Z95.1 PRESENCE OF AORTOCORONARY BYPASS GRAFT: ICD-10-CM

## 2018-12-13 DIAGNOSIS — I35.9 NONRHEUMATIC AORTIC VALVE DISORDER, UNSPECIFIED: ICD-10-CM

## 2018-12-13 DIAGNOSIS — G47.33 OBSTRUCTIVE SLEEP APNEA (ADULT) (PEDIATRIC): ICD-10-CM

## 2018-12-13 DIAGNOSIS — I66.21 OCCLUSION AND STENOSIS OF RIGHT POSTERIOR CEREBRAL ARTERY: ICD-10-CM

## 2018-12-13 DIAGNOSIS — G47.31 PRIMARY CENTRAL SLEEP APNEA: ICD-10-CM

## 2018-12-13 DIAGNOSIS — G40.909 EPILEPSY, UNSPECIFIED, NOT INTRACTABLE, WITHOUT STATUS EPILEPTICUS: ICD-10-CM

## 2018-12-13 DIAGNOSIS — I25.10 ATHEROSCLEROTIC HEART DISEASE OF NATIVE CORONARY ARTERY WITHOUT ANGINA PECTORIS: ICD-10-CM

## 2018-12-13 RX ORDER — TIMOLOL 0.5 %
1 DROPS OPHTHALMIC (EYE)
Qty: 0 | Refills: 0 | COMMUNITY

## 2018-12-13 RX ORDER — TIMOLOL 0.5 %
1 DROPS OPHTHALMIC (EYE)
Qty: 5 | Refills: 0 | OUTPATIENT
Start: 2018-12-13 | End: 2019-01-11

## 2021-01-17 NOTE — ED ADULT NURSE NOTE - NIH STROKE SCALE: 5B. MOTOR ARM, RIGHT, QM
Facility calling to verify admission orders 
(0) No drift; limb holds 90 (or 45) degrees for full 10 secs

## 2021-07-29 NOTE — CONSULT NOTE ADULT - ASSESSMENT
83 year old man with an episode of moaning, eyes rolled back, followed by a left facial droop.  He seems to have evidence of a possible tongue bite which is concerning for possible seizure.  Will get EEG tomorrow.  MRI brain/MRA brain/MRA neck pending.  Aspirin increased to 325 mg daily.  Continue statin.  Will follow.
[FreeTextEntry1] : EKG - normal sinus rhythm, no ST segment elevation or depression

## 2023-03-03 NOTE — ED ADULT NURSE NOTE - PAIN RATING/NUMBER SCALE (0-10): REST
Please use the antibiotic drops as prescribed for the external ear infection. You can use tylenol or motrin for pain as needed. If you develop any pain, redness or swelling around your ear you need to go to the ED. If you develop a fever or any other worsening or concerning symptoms please go to the ED. Otherwise follow up with your primary care doctor. After completion of the antibiotic drops, please get Debrox wax softening drops and use it once a week to prevent further wax buildup. 0

## 2023-09-07 NOTE — DISCHARGE NOTE ADULT - FUNCTIONAL SCREEN CURRENT LEVEL: AMBULATION, MLM
[address] [place] [address]
3 = assistive equipment and person

## 2024-12-04 NOTE — ED ADULT NURSE NOTE - NIH STROKE SCALE: 8. SENSORY, QM
(0) Normal; no sensory loss
EOS calculated successfully. EOS Risk Factor: 0.5/1000 live births ([facility] national incidence); GA=39w2d; Temp=97.9; ROM=3.55; GBS='Negative'; Antibiotics='No antibiotics or any antibiotics < 2 hrs prior to birth'